# Patient Record
Sex: FEMALE | Race: WHITE | NOT HISPANIC OR LATINO | Employment: PART TIME | URBAN - METROPOLITAN AREA
[De-identification: names, ages, dates, MRNs, and addresses within clinical notes are randomized per-mention and may not be internally consistent; named-entity substitution may affect disease eponyms.]

---

## 2023-11-20 ENCOUNTER — HOSPITAL ENCOUNTER (INPATIENT)
Facility: MEDICAL CENTER | Age: 58
LOS: 2 days | DRG: 948 | End: 2023-11-22
Attending: STUDENT IN AN ORGANIZED HEALTH CARE EDUCATION/TRAINING PROGRAM | Admitting: INTERNAL MEDICINE
Payer: OTHER MISCELLANEOUS

## 2023-11-20 DIAGNOSIS — K59.00 CONSTIPATION, UNSPECIFIED CONSTIPATION TYPE: ICD-10-CM

## 2023-11-20 DIAGNOSIS — R10.9 ACUTE ABDOMINAL PAIN: ICD-10-CM

## 2023-11-20 PROBLEM — G89.29 CHRONIC LOWER BACK PAIN: Status: ACTIVE | Noted: 2023-11-20

## 2023-11-20 PROBLEM — M54.50 CHRONIC LOWER BACK PAIN: Status: ACTIVE | Noted: 2023-11-20

## 2023-11-20 PROCEDURE — 700102 HCHG RX REV CODE 250 W/ 637 OVERRIDE(OP): Performed by: INTERNAL MEDICINE

## 2023-11-20 PROCEDURE — 700111 HCHG RX REV CODE 636 W/ 250 OVERRIDE (IP): Mod: JZ | Performed by: INTERNAL MEDICINE

## 2023-11-20 PROCEDURE — 770006 HCHG ROOM/CARE - MED/SURG/GYN SEMI*

## 2023-11-20 PROCEDURE — A9270 NON-COVERED ITEM OR SERVICE: HCPCS | Performed by: INTERNAL MEDICINE

## 2023-11-20 PROCEDURE — 99223 1ST HOSP IP/OBS HIGH 75: CPT | Mod: AI | Performed by: INTERNAL MEDICINE

## 2023-11-20 RX ORDER — ACETAMINOPHEN 325 MG/1
650 TABLET ORAL EVERY 6 HOURS PRN
Status: DISCONTINUED | OUTPATIENT
Start: 2023-11-20 | End: 2023-11-22 | Stop reason: HOSPADM

## 2023-11-20 RX ORDER — BISACODYL 10 MG
10 SUPPOSITORY, RECTAL RECTAL
Status: DISCONTINUED | OUTPATIENT
Start: 2023-11-20 | End: 2023-11-22 | Stop reason: HOSPADM

## 2023-11-20 RX ORDER — ONDANSETRON 2 MG/ML
4 INJECTION INTRAMUSCULAR; INTRAVENOUS EVERY 4 HOURS PRN
Status: DISCONTINUED | OUTPATIENT
Start: 2023-11-20 | End: 2023-11-22 | Stop reason: HOSPADM

## 2023-11-20 RX ORDER — PROCHLORPERAZINE EDISYLATE 5 MG/ML
5-10 INJECTION INTRAMUSCULAR; INTRAVENOUS EVERY 4 HOURS PRN
Status: DISCONTINUED | OUTPATIENT
Start: 2023-11-20 | End: 2023-11-22 | Stop reason: HOSPADM

## 2023-11-20 RX ORDER — ENOXAPARIN SODIUM 100 MG/ML
40 INJECTION SUBCUTANEOUS DAILY
Status: DISCONTINUED | OUTPATIENT
Start: 2023-11-20 | End: 2023-11-22 | Stop reason: HOSPADM

## 2023-11-20 RX ORDER — OXYCODONE HYDROCHLORIDE 5 MG/1
5 TABLET ORAL
Status: DISCONTINUED | OUTPATIENT
Start: 2023-11-20 | End: 2023-11-22 | Stop reason: HOSPADM

## 2023-11-20 RX ORDER — POLYETHYLENE GLYCOL 3350 17 G/17G
1 POWDER, FOR SOLUTION ORAL
Status: DISCONTINUED | OUTPATIENT
Start: 2023-11-20 | End: 2023-11-22 | Stop reason: HOSPADM

## 2023-11-20 RX ORDER — PROMETHAZINE HYDROCHLORIDE 25 MG/1
12.5-25 TABLET ORAL EVERY 4 HOURS PRN
Status: DISCONTINUED | OUTPATIENT
Start: 2023-11-20 | End: 2023-11-22 | Stop reason: HOSPADM

## 2023-11-20 RX ORDER — LABETALOL HYDROCHLORIDE 5 MG/ML
10 INJECTION, SOLUTION INTRAVENOUS EVERY 4 HOURS PRN
Status: DISCONTINUED | OUTPATIENT
Start: 2023-11-20 | End: 2023-11-22 | Stop reason: HOSPADM

## 2023-11-20 RX ORDER — ONDANSETRON 4 MG/1
4 TABLET, ORALLY DISINTEGRATING ORAL EVERY 4 HOURS PRN
Status: DISCONTINUED | OUTPATIENT
Start: 2023-11-20 | End: 2023-11-22 | Stop reason: HOSPADM

## 2023-11-20 RX ORDER — TRAZODONE HYDROCHLORIDE 50 MG/1
25 TABLET ORAL
Status: DISCONTINUED | OUTPATIENT
Start: 2023-11-20 | End: 2023-11-22 | Stop reason: HOSPADM

## 2023-11-20 RX ORDER — MORPHINE SULFATE 4 MG/ML
4 INJECTION INTRAVENOUS
Status: DISCONTINUED | OUTPATIENT
Start: 2023-11-20 | End: 2023-11-22 | Stop reason: HOSPADM

## 2023-11-20 RX ORDER — OXYCODONE HYDROCHLORIDE 10 MG/1
10 TABLET ORAL
Status: DISCONTINUED | OUTPATIENT
Start: 2023-11-20 | End: 2023-11-22 | Stop reason: HOSPADM

## 2023-11-20 RX ORDER — ACETAMINOPHEN 325 MG/1
650 TABLET ORAL EVERY 6 HOURS PRN
Status: DISCONTINUED | OUTPATIENT
Start: 2023-11-20 | End: 2023-11-20

## 2023-11-20 RX ORDER — AMOXICILLIN 250 MG
2 CAPSULE ORAL 2 TIMES DAILY
Status: DISCONTINUED | OUTPATIENT
Start: 2023-11-20 | End: 2023-11-22 | Stop reason: HOSPADM

## 2023-11-20 RX ORDER — TRAMADOL HYDROCHLORIDE 50 MG/1
50 TABLET ORAL EVERY 6 HOURS PRN
Status: DISCONTINUED | OUTPATIENT
Start: 2023-11-20 | End: 2023-11-22 | Stop reason: HOSPADM

## 2023-11-20 RX ORDER — ONDANSETRON 2 MG/ML
4 INJECTION INTRAMUSCULAR; INTRAVENOUS EVERY 4 HOURS PRN
Status: DISCONTINUED | OUTPATIENT
Start: 2023-11-20 | End: 2023-11-20

## 2023-11-20 RX ORDER — PROMETHAZINE HYDROCHLORIDE 25 MG/1
12.5-25 SUPPOSITORY RECTAL EVERY 4 HOURS PRN
Status: DISCONTINUED | OUTPATIENT
Start: 2023-11-20 | End: 2023-11-22 | Stop reason: HOSPADM

## 2023-11-20 RX ADMIN — DOCUSATE SODIUM 50 MG AND SENNOSIDES 8.6 MG 2 TABLET: 8.6; 5 TABLET, FILM COATED ORAL at 20:04

## 2023-11-20 RX ADMIN — OXYCODONE 5 MG: 5 TABLET ORAL at 23:47

## 2023-11-20 RX ADMIN — TRAZODONE HYDROCHLORIDE 25 MG: 50 TABLET ORAL at 23:48

## 2023-11-20 RX ADMIN — ENOXAPARIN SODIUM 40 MG: 100 INJECTION SUBCUTANEOUS at 23:48

## 2023-11-20 ASSESSMENT — ENCOUNTER SYMPTOMS
POLYDIPSIA: 0
FEVER: 0
PHOTOPHOBIA: 0
COUGH: 0
ABDOMINAL PAIN: 1
FOCAL WEAKNESS: 0
PALPITATIONS: 0
NECK PAIN: 0
DOUBLE VISION: 0
SPEECH CHANGE: 0
NAUSEA: 0
ORTHOPNEA: 0
VOMITING: 0
SPUTUM PRODUCTION: 0
BLURRED VISION: 0
BRUISES/BLEEDS EASILY: 0
BACK PAIN: 0
FLANK PAIN: 0
HEMOPTYSIS: 0
WEIGHT LOSS: 0
HALLUCINATIONS: 0
CHILLS: 0
HEADACHES: 0
NERVOUS/ANXIOUS: 0
DIARRHEA: 0
CONSTIPATION: 1
HEARTBURN: 0
TREMORS: 0

## 2023-11-20 ASSESSMENT — COGNITIVE AND FUNCTIONAL STATUS - GENERAL
SUGGESTED CMS G CODE MODIFIER MOBILITY: CH
SUGGESTED CMS G CODE MODIFIER DAILY ACTIVITY: CH
MOBILITY SCORE: 24
DAILY ACTIVITIY SCORE: 24

## 2023-11-20 ASSESSMENT — LIFESTYLE VARIABLES
SUBSTANCE_ABUSE: 0
DOES PATIENT WANT TO STOP DRINKING: NO
EVER FELT BAD OR GUILTY ABOUT YOUR DRINKING: NO
CONSUMPTION TOTAL: NEGATIVE
HOW MANY TIMES IN THE PAST YEAR HAVE YOU HAD 5 OR MORE DRINKS IN A DAY: 0
TOTAL SCORE: 0
AVERAGE NUMBER OF DAYS PER WEEK YOU HAVE A DRINK CONTAINING ALCOHOL: 0
EVER HAD A DRINK FIRST THING IN THE MORNING TO STEADY YOUR NERVES TO GET RID OF A HANGOVER: NO
HAVE PEOPLE ANNOYED YOU BY CRITICIZING YOUR DRINKING: NO
TOTAL SCORE: 0
ALCOHOL_USE: NO
ON A TYPICAL DAY WHEN YOU DRINK ALCOHOL HOW MANY DRINKS DO YOU HAVE: 0
TOTAL SCORE: 0
HAVE YOU EVER FELT YOU SHOULD CUT DOWN ON YOUR DRINKING: NO

## 2023-11-20 ASSESSMENT — PATIENT HEALTH QUESTIONNAIRE - PHQ9
2. FEELING DOWN, DEPRESSED, IRRITABLE, OR HOPELESS: NOT AT ALL
SUM OF ALL RESPONSES TO PHQ9 QUESTIONS 1 AND 2: 0
1. LITTLE INTEREST OR PLEASURE IN DOING THINGS: NOT AT ALL

## 2023-11-20 ASSESSMENT — PAIN DESCRIPTION - PAIN TYPE: TYPE: ACUTE PAIN

## 2023-11-20 NOTE — PROGRESS NOTES
[facility] HOSPITALIST  DIRECT ADMISSION REPORT  Transferring facility:  [facility], [place], operated by [facility]  Transferring physician: Dr. Stevens  Code status: FULL    Chief complaint:   59 y/o F with hx of cholecystectomy (needed adhesion lysis and bile duct injury needed bypass) presented with acute on chronic abdominal pain for one month - fluid collection in abd (1.5 x 1.5x 1cm in GB area) and abd wall (6 x 2 x 1.5cm). No redness or induration     Vitals wnl  Labs - Normal WBC     Rx: morphine x1     Possible infection but without signs and symptoms of sepsis, Abx was on hold.    Case was discussed with Gen Surg at [facility] - recommended IR drainage.     The transfer request with a direct admit request was made as IR services is not available at this hospital    Further work up or recommendations per triage officer prior to transfer: Monitor    Consultants called prior to transfer and pertinent input from consultants: N/A     Consultants to be called upon arrival: IR and possible Gen Surg  Admission status: Inpatient  Floor requested: Med-Surg  Code Status: FULL    If ICU transfer, name of intensivist case discussed with and pertinent input from critical care: N/A    Please inform the RTOC triage coordinator upon arrival of the patient to [facility] for assignment of a hospitalist to perform admission.     For any question or concerns regarding the care of this patient, please reach out to the assigned hospitalist.

## 2023-11-21 ENCOUNTER — HOSPITAL ENCOUNTER (OUTPATIENT)
Dept: RADIOLOGY | Facility: MEDICAL CENTER | Age: 58
End: 2023-11-21
Payer: OTHER MISCELLANEOUS

## 2023-11-21 LAB
ALBUMIN SERPL BCP-MCNC: 3.9 G/DL (ref 3.2–4.9)
ALBUMIN/GLOB SERPL: 1.4 G/DL
ALP SERPL-CCNC: 91 U/L (ref 30–99)
ALT SERPL-CCNC: 7 U/L (ref 2–50)
ANION GAP SERPL CALC-SCNC: 11 MMOL/L (ref 7–16)
AST SERPL-CCNC: 14 U/L (ref 12–45)
BASOPHILS # BLD AUTO: 0.5 % (ref 0–1.8)
BASOPHILS # BLD: 0.04 K/UL (ref 0–0.12)
BILIRUB SERPL-MCNC: 0.6 MG/DL (ref 0.1–1.5)
BUN SERPL-MCNC: 5 MG/DL (ref 8–22)
CALCIUM ALBUM COR SERPL-MCNC: 8.9 MG/DL (ref 8.5–10.5)
CALCIUM SERPL-MCNC: 8.8 MG/DL (ref 8.5–10.5)
CHLORIDE SERPL-SCNC: 105 MMOL/L (ref 96–112)
CO2 SERPL-SCNC: 24 MMOL/L (ref 20–33)
CREAT SERPL-MCNC: 0.45 MG/DL (ref 0.5–1.4)
CRP SERPL HS-MCNC: 2.09 MG/DL (ref 0–0.75)
EOSINOPHIL # BLD AUTO: 0.02 K/UL (ref 0–0.51)
EOSINOPHIL NFR BLD: 0.2 % (ref 0–6.9)
ERYTHROCYTE [DISTWIDTH] IN BLOOD BY AUTOMATED COUNT: 36.7 FL (ref 35.9–50)
ERYTHROCYTE [SEDIMENTATION RATE] IN BLOOD BY WESTERGREN METHOD: 22 MM/HOUR (ref 0–25)
GFR SERPLBLD CREATININE-BSD FMLA CKD-EPI: 112 ML/MIN/1.73 M 2
GLOBULIN SER CALC-MCNC: 2.7 G/DL (ref 1.9–3.5)
GLUCOSE SERPL-MCNC: 107 MG/DL (ref 65–99)
HCT VFR BLD AUTO: 35 % (ref 37–47)
HGB BLD-MCNC: 11.7 G/DL (ref 12–16)
IMM GRANULOCYTES # BLD AUTO: 0.01 K/UL (ref 0–0.11)
IMM GRANULOCYTES NFR BLD AUTO: 0.1 % (ref 0–0.9)
INR PPP: 1.21 (ref 0.87–1.13)
LYMPHOCYTES # BLD AUTO: 1.63 K/UL (ref 1–4.8)
LYMPHOCYTES NFR BLD: 18.4 % (ref 22–41)
MCH RBC QN AUTO: 29.1 PG (ref 27–33)
MCHC RBC AUTO-ENTMCNC: 33.4 G/DL (ref 32.2–35.5)
MCV RBC AUTO: 87.1 FL (ref 81.4–97.8)
MONOCYTES # BLD AUTO: 0.67 K/UL (ref 0–0.85)
MONOCYTES NFR BLD AUTO: 7.6 % (ref 0–13.4)
NEUTROPHILS # BLD AUTO: 6.47 K/UL (ref 1.82–7.42)
NEUTROPHILS NFR BLD: 73.2 % (ref 44–72)
NRBC # BLD AUTO: 0 K/UL
NRBC BLD-RTO: 0 /100 WBC (ref 0–0.2)
PLATELET # BLD AUTO: 246 K/UL (ref 164–446)
PMV BLD AUTO: 10.9 FL (ref 9–12.9)
POTASSIUM SERPL-SCNC: 3.8 MMOL/L (ref 3.6–5.5)
PROT SERPL-MCNC: 6.6 G/DL (ref 6–8.2)
PROTHROMBIN TIME: 15.4 SEC (ref 12–14.6)
RBC # BLD AUTO: 4.02 M/UL (ref 4.2–5.4)
SODIUM SERPL-SCNC: 140 MMOL/L (ref 135–145)
WBC # BLD AUTO: 8.8 K/UL (ref 4.8–10.8)

## 2023-11-21 PROCEDURE — 99233 SBSQ HOSP IP/OBS HIGH 50: CPT | Performed by: INTERNAL MEDICINE

## 2023-11-21 PROCEDURE — 700111 HCHG RX REV CODE 636 W/ 250 OVERRIDE (IP): Mod: JZ | Performed by: INTERNAL MEDICINE

## 2023-11-21 PROCEDURE — 86140 C-REACTIVE PROTEIN: CPT

## 2023-11-21 PROCEDURE — 700102 HCHG RX REV CODE 250 W/ 637 OVERRIDE(OP): Performed by: INTERNAL MEDICINE

## 2023-11-21 PROCEDURE — 85025 COMPLETE CBC W/AUTO DIFF WBC: CPT

## 2023-11-21 PROCEDURE — A9270 NON-COVERED ITEM OR SERVICE: HCPCS | Performed by: INTERNAL MEDICINE

## 2023-11-21 PROCEDURE — 700105 HCHG RX REV CODE 258: Performed by: INTERNAL MEDICINE

## 2023-11-21 PROCEDURE — 85610 PROTHROMBIN TIME: CPT

## 2023-11-21 PROCEDURE — 85652 RBC SED RATE AUTOMATED: CPT

## 2023-11-21 PROCEDURE — 770006 HCHG ROOM/CARE - MED/SURG/GYN SEMI*

## 2023-11-21 PROCEDURE — 80053 COMPREHEN METABOLIC PANEL: CPT

## 2023-11-21 RX ORDER — GABAPENTIN 300 MG/1
300 CAPSULE ORAL
COMMUNITY

## 2023-11-21 RX ORDER — BACLOFEN 10 MG/1
5 TABLET ORAL 3 TIMES DAILY
Status: DISCONTINUED | OUTPATIENT
Start: 2023-11-21 | End: 2023-11-22 | Stop reason: HOSPADM

## 2023-11-21 RX ORDER — TRAZODONE HYDROCHLORIDE 50 MG/1
25 TABLET ORAL NIGHTLY
COMMUNITY

## 2023-11-21 RX ORDER — GABAPENTIN 100 MG/1
100 CAPSULE ORAL 3 TIMES DAILY
Status: DISCONTINUED | OUTPATIENT
Start: 2023-11-21 | End: 2023-11-22 | Stop reason: HOSPADM

## 2023-11-21 RX ORDER — TRAMADOL HYDROCHLORIDE 50 MG/1
50 TABLET ORAL
COMMUNITY

## 2023-11-21 RX ORDER — SODIUM CHLORIDE, SODIUM LACTATE, POTASSIUM CHLORIDE, CALCIUM CHLORIDE 600; 310; 30; 20 MG/100ML; MG/100ML; MG/100ML; MG/100ML
INJECTION, SOLUTION INTRAVENOUS CONTINUOUS
Status: DISCONTINUED | OUTPATIENT
Start: 2023-11-21 | End: 2023-11-21

## 2023-11-21 RX ADMIN — GABAPENTIN 100 MG: 100 CAPSULE ORAL at 17:36

## 2023-11-21 RX ADMIN — BACLOFEN 5 MG: 10 TABLET ORAL at 17:36

## 2023-11-21 RX ADMIN — DOCUSATE SODIUM 50 MG AND SENNOSIDES 8.6 MG 2 TABLET: 8.6; 5 TABLET, FILM COATED ORAL at 05:24

## 2023-11-21 RX ADMIN — TRAZODONE HYDROCHLORIDE 25 MG: 50 TABLET ORAL at 20:59

## 2023-11-21 RX ADMIN — ENOXAPARIN SODIUM 40 MG: 100 INJECTION SUBCUTANEOUS at 17:36

## 2023-11-21 RX ADMIN — SODIUM CHLORIDE, POTASSIUM CHLORIDE, SODIUM LACTATE AND CALCIUM CHLORIDE: 600; 310; 30; 20 INJECTION, SOLUTION INTRAVENOUS at 10:12

## 2023-11-21 ASSESSMENT — ENCOUNTER SYMPTOMS
PALPITATIONS: 0
MYALGIAS: 0
WEIGHT LOSS: 0
DIARRHEA: 0
PHOTOPHOBIA: 0
ABDOMINAL PAIN: 1
HEADACHES: 0
BACK PAIN: 0
CHILLS: 0
NAUSEA: 1
NECK PAIN: 0
SPUTUM PRODUCTION: 0
FEVER: 0
TINGLING: 0
ORTHOPNEA: 0
HALLUCINATIONS: 0
EYE PAIN: 0
DIZZINESS: 0
BLURRED VISION: 0
SHORTNESS OF BREATH: 0
VOMITING: 0
SPEECH CHANGE: 0
FOCAL WEAKNESS: 0
TREMORS: 0
DOUBLE VISION: 0
CONSTIPATION: 0
SENSORY CHANGE: 0
COUGH: 0

## 2023-11-21 ASSESSMENT — PAIN DESCRIPTION - PAIN TYPE
TYPE: ACUTE PAIN

## 2023-11-21 ASSESSMENT — PAIN SCALES - WONG BAKER
WONGBAKER_NUMERICALRESPONSE: HURTS JUST A LITTLE BIT
WONGBAKER_NUMERICALRESPONSE: HURTS JUST A LITTLE BIT

## 2023-11-21 ASSESSMENT — LIFESTYLE VARIABLES: SUBSTANCE_ABUSE: 0

## 2023-11-21 NOTE — ASSESSMENT & PLAN NOTE
58-year-old female with complicated cholecystectomy in 2019 with biliary tree  bypass, presented with gradually worsening abdominal pain to the right of umbilicus, that started 1 month ago and gradually progressing and moving towards the right upper quadrant  LFT and bilirubin are not elevated  CT of the abdomen revealed fluid in gallbladder fossa, fluid in the anterior abdominal wall corresponding to localized tenderness, to the right of umbilicus  I discussed with intervention radiologist and surgery.  Patient does not show signs of acute infection and holding administration of antibiotic.  Patient ESR is within normal limits.  The patient will show signs of infection she may need aspiration but at this time she does not show signs of infection.  I discussed plan of care with her and she is in agreement not to pursue further intervention at this time after discussion.  I started her on baclofen as she reported she lifted some heavy weight and it could be muscular pain.  I resume her outpatient gabapentin.   Declined

## 2023-11-21 NOTE — PROGRESS NOTES
Patient arrive via EMS by 1930 hr, she walked towards her room steady steps. She denied nausea,but some pain which she rated about  5/10 because the movement. Pain is in the RUQ and around the navel area. New iv of 22 G  inserted at the Right antecubital area. Last BM on the Nov 18. Bed in lowest and locked position. Call bell within reach.

## 2023-11-21 NOTE — PROGRESS NOTES
"Patient stated that she slept okay;\" pain still there, but it is no bad; unless I moved\"  Also, she feels anxious about wanting to get it over. She was requesting something to help to kill the anxiety.  "

## 2023-11-21 NOTE — PROGRESS NOTES
[facility] Medicine Daily Progress Note    Date of Service  11/21/2023    Chief Complaint  Precious Rosenberg is a 58 y.o. female admitted 11/20/2023 with diffuse abdominal pain    Hospital Course    58-year-old female with history of chronic lower back pain, complicated cholecystectomy in 2019 requiring a bypass of her bile duct,  who presented as a outside facility, complaining of diffuse abdominal pain for 1 months,  alternating constipation and diarrhea with constipation predominance, last BM 2 days ago, denies nausea vomiting chills or fever.  She denies weight loss as well.    CT of the abdomen and pelvis with contrast: Fluid collection in the abdominal wall to the right of umbilicus measuring 6 x 2 x 1.5 cm, may represent postop hematoma, abscess cannot be ruled out.  There is a small fluid collection adjacent to the abdominal wall which may be in the expected location of the gallbladder possibly representing postop fluid collection measuring about 1.5 x 1.5 x 1.0 cm.  There is some air in the biliary tree in the region of the michael hepatis and the left lobe of liver.  There appears to be some surrounding edematous tissue  possibly involving the antrum of the stomach, evidence of previous gastric surgery.  Pancreas and adrenal glands are unremarkable.  Small stone in the right kidney.  Kidneys are unremarkable.  No abnormality of the bowel is identified.  Appendix is not identified.      Lungs bases are clear.  Pelvis show fluid in the bladder.  Structure which may represent the uterus appears to be retroflexed at and appears to be small suggestions this may represent a cervical remnant     Patient was admitted to hospital and IR consult was requested.  I discussed plan of care with Dr. Ford interventional neurologist and patient has 2 small pockets of fluid.  I also requested consultation with Dr. Salvador who reviewed and recommended that patient has very small amount of fluid and he would not do strongly recommend  for drainage.    Interval Problem Update    11/21/23  I evaluated and examined her at the bedside.  She reported she is feeling better and she notices pain when she moves or increased pressure in the abdomen by coughing or vomiting.  I requested consultation with surgery Dr. Salvador who recommended that patient does not need surgical intervention and he would not strongly recommend for IR guided drainage.  I discussed with intervention radiologist Dr. Ford who expressed that patient is too small pockets and she will not require IR drain but he can aspirated but it is too small.  I reevaluated again at the bedside and explained her that chances of having this fluid collection being infected is less as patient white blood cell count is within normal limits and she remains afebrile.  Her ESR is normal limits.  She has elevated CRP.  After discussion plan is to hold for IR guided aspiration.  Patient reported she has been working and she left some weight that could also lead to muscle soreness.  I started her on muscle relaxant and resume her outpatient gabapentin.  I started her on diet.    I have discussed this patient's plan of care and discharge plan at IDT rounds today with Case Management, Nursing, Nursing leadership, and other members of the IDT team.    Consultants/Specialty  None    Code Status  Full Code    Disposition  The patient is not medically cleared for discharge to home or a post-acute facility.  Anticipate discharge to: home with close outpatient follow-up    I have placed the appropriate orders for post-discharge needs.    Review of Systems  Review of Systems   Constitutional:  Negative for chills, fever and weight loss.   HENT:  Negative for hearing loss and tinnitus.    Eyes:  Negative for blurred vision, double vision, photophobia and pain.   Respiratory:  Negative for cough, sputum production and shortness of breath.    Cardiovascular:  Negative for chest pain, palpitations, orthopnea and leg  swelling.   Gastrointestinal:  Positive for abdominal pain and nausea. Negative for constipation, diarrhea and vomiting.   Genitourinary:  Negative for dysuria, frequency and urgency.   Musculoskeletal:  Negative for back pain, joint pain, myalgias and neck pain.   Skin:  Negative for rash.   Neurological:  Negative for dizziness, tingling, tremors, sensory change, speech change, focal weakness and headaches.   Psychiatric/Behavioral:  Negative for hallucinations and substance abuse.    All other systems reviewed and are negative.       Physical Exam  Temp:  [35.9 °C (96.6 °F)-36.6 °C (97.8 °F)] 36.2 °C (97.1 °F)  Pulse:  [74-88] 83  Resp:  [16-17] 16  BP: (107-124)/(64-74) 116/72  SpO2:  [93 %-98 %] 94 %    Physical Exam  Vitals reviewed.   Constitutional:       General: She is not in acute distress.     Appearance: Normal appearance. She is not ill-appearing.   HENT:      Head: Normocephalic and atraumatic.      Nose: No congestion.   Eyes:      General:         Right eye: No discharge.         Left eye: No discharge.      Pupils: Pupils are equal, round, and reactive to light.   Cardiovascular:      Rate and Rhythm: Normal rate and regular rhythm.      Pulses: Normal pulses.      Heart sounds: Normal heart sounds. No murmur heard.  Pulmonary:      Effort: Pulmonary effort is normal. No respiratory distress.      Breath sounds: Normal breath sounds. No stridor.   Abdominal:      General: Bowel sounds are normal. There is no distension.      Palpations: Abdomen is soft.      Tenderness: There is abdominal tenderness (Mild in paramedical region).      Comments: No signs of acute abdomen on physical exam.   Musculoskeletal:         General: No swelling or tenderness. Normal range of motion.      Cervical back: Normal range of motion. No rigidity.   Skin:     General: Skin is warm.      Capillary Refill: Capillary refill takes less than 2 seconds.      Coloration: Skin is not jaundiced or pale.      Findings: No  bruising.   Neurological:      General: No focal deficit present.      Mental Status: She is alert and oriented to person, place, and time.      Cranial Nerves: No cranial nerve deficit.   Psychiatric:         Mood and Affect: Mood normal.         Behavior: Behavior normal.         Fluids  No intake or output data in the 24 hours ending 11/21/23 1445    Laboratory  Recent Labs     11/21/23  0032   WBC 8.8   RBC 4.02*   HEMOGLOBIN 11.7*   HEMATOCRIT 35.0*   MCV 87.1   MCH 29.1   MCHC 33.4   RDW 36.7   PLATELETCT 246   MPV 10.9     Recent Labs     11/21/23  0032   SODIUM 140   POTASSIUM 3.8   CHLORIDE 105   CO2 24   GLUCOSE 107*   BUN 5*   CREATININE 0.45*   CALCIUM 8.8     Recent Labs     11/21/23  0032   INR 1.21*               Imaging  OUTSIDE IMAGES-CT ABDOMEN /PELVIS   Final Result      CT-DRAIN-PERITONEAL    (Results Pending)        Assessment/Plan  * Abdominal pain and intra-abdominal fluid collection- (present on admission)  Assessment & Plan  58-year-old female with complicated cholecystectomy in 2019 with biliary tree  bypass, presented with gradually worsening abdominal pain to the right of umbilicus, that started 1 month ago and gradually progressing and moving towards the right upper quadrant  LFT and bilirubin are not elevated  CT of the abdomen revealed fluid in gallbladder fossa, fluid in the anterior abdominal wall corresponding to localized tenderness, to the right of umbilicus  I discussed with intervention radiologist and surgery.  Patient does not show signs of acute infection and holding administration of antibiotic.  Patient ESR is within normal limits.  The patient will show signs of infection she may need aspiration but at this time she does not show signs of infection.  I discussed plan of care with her and she is in agreement not to pursue further intervention at this time after discussion.  I started her on baclofen as she reported she lifted some heavy weight and it could be muscular pain.   I resume her outpatient gabapentin.    Constipation  Assessment & Plan  Bowel regimen    Chronic lower back pain  Assessment & Plan  Tylenol, tramadol as needed      I reviewed CT scans finding with interventional radiologist Dr. Ford.    I requested consultation and discussed with general surgery Dr. Salvador regarding patient current medical condition and plan of care.    >51 minutes total time spent in records review, lab/radiology assessment, patient history and assessment, and directing plan of care with high level medical decision making complexity. See orders.      VTE prophylaxis:    enoxaparin ppx      I have performed a physical exam and reviewed and updated ROS and Plan today (11/21/2023). In review of yesterday's note (11/20/2023), there are no changes except as documented above.

## 2023-11-21 NOTE — CARE PLAN
The patient is Stable - Low risk of patient condition declining or worsening    Shift Goals  Clinical Goals:  (pain management, IR procedure)  Patient Goals:  (comfort, sleep)  Family Goals: NA    Progress made toward(s) clinical / shift goals:    Problem: Knowledge Deficit - Standard  Goal: Patient and family/care givers will demonstrate understanding of plan of care, disease process/condition, diagnostic tests and medications  Outcome: Progressing  Note: Patient understands POC, patient kept notified regarding procedure     Problem: Pain - Standard  Goal: Alleviation of pain or a reduction in pain to the patient’s comfort goal  11/21/2023 1129 by Sharron Rocha R.N.  Outcome: Progressing  11/21/2023 1128 by Sharron Rocha R.N.  Flowsheets (Taken 11/21/2023 1128)  Non Verbal Scale: Sleeping  Pain Rating Scale (NPRS): 0  Note: Patient sleeping at this time       Patient is not progressing towards the following goals:

## 2023-11-21 NOTE — CARE PLAN
The patient is Stable - Low risk of patient condition declining or worsening    Shift Goals  Clinical Goals: pain control,  comfort  Patient Goals: comfort, sleep  Family Goals: NA    Progress made toward(s) clinical / shift goals: Accepted that she has pain because the movement, but if she stay put, the pain is tolerable. Ate dinner prior her NPO order. New 22 G iv placed in the RAC. Bed in lowest and locked position. Call bell within reach.      Problem: Knowledge Deficit - Standard  Goal: Patient and family/care givers will demonstrate understanding of plan of care, disease process/condition, diagnostic tests and medications  Description: Target End Date:  1-3 days or as soon as patient condition allows    Document in Patient Education    1.  Patient and family/caregiver oriented to unit, equipment, visitation policy and means for communicating concern  2.  Complete/review Learning Assessment  3.  Assess knowledge level of disease process/condition, treatment plan, diagnostic tests and medications  4.  Explain disease process/condition, treatment plan, diagnostic tests and medications  Outcome: Progressing

## 2023-11-21 NOTE — H&P
Hospital Medicine History & Physical Note    Date of Service  11/20/2023    Primary Care Physician  No primary care provider on file.        Code Status  Full Code    Chief Complaint  No chief complaint on file.      History of Presenting Illness  58-year-old female with history of chronic lower back pain, complicated cholecystectomy in 2019 requiring a bypass of her bile duct,  who presented as a outside facility, complaining of diffuse abdominal pain for 1 months,  alternating constipation and diarrhea with constipation predominance, last BM 2 days ago, denies nausea vomiting chills or fever.  She denies weight loss as well.    CT of the abdomen and pelvis with contrast: Fluid collection in the abdominal wall to the right of umbilicus measuring 6 x 2 x 1.5 cm, may represent postop hematoma, abscess cannot be ruled out.  There is a small fluid collection adjacent to the abdominal wall which may be in the expected location of the gallbladder possibly representing postop fluid collection measuring about 1.5 x 1.5 x 1.0 cm.  There is some air in the biliary tree in the region of the michael hepatis and the left lobe of liver.  There appears to be some surrounding edematous tissue  possibly involving the antrum of the stomach, evidence of previous gastric surgery.  Pancreas and adrenal glands are unremarkable.  Small stone in the right kidney.  Kidneys are unremarkable.  No abnormality of the bowel is identified.  Appendix is not identified.      Lungs bases are clear.  Pelvis show fluid in the bladder.  Structure which may represent the uterus appears to be retroflexed at and appears to be small suggestions this may represent a cervical remnant.  No specific abnormality.  CBC: WBC 9.0, hemoglobin 15.5, platelets 280.  Chemistry: Potassium 3.5, lipase 66, LFTs not elevated, otherwise no significant abnormalities.  Bilirubin is also normal.  Medications given: Morphine IV 4 mg, NS bolus 1 L, Zofran 4 mg.  Home  medications: Tramadol as needed, trazodone as needed, gabapentin         I discussed the plan of care with patient and bedside RN .    Review of Systems  Review of Systems   Constitutional:  Negative for chills, fever and weight loss.   HENT:  Negative for ear pain, hearing loss and tinnitus.    Eyes:  Negative for blurred vision, double vision and photophobia.   Respiratory:  Negative for cough, hemoptysis and sputum production.    Cardiovascular:  Negative for chest pain, palpitations and orthopnea.   Gastrointestinal:  Positive for abdominal pain and constipation. Negative for diarrhea, heartburn, nausea and vomiting.   Genitourinary:  Negative for dysuria, flank pain, frequency and hematuria.   Musculoskeletal:  Negative for back pain, joint pain and neck pain.   Skin:  Negative for itching and rash.   Neurological:  Negative for tremors, speech change, focal weakness and headaches.   Endo/Heme/Allergies:  Negative for environmental allergies and polydipsia. Does not bruise/bleed easily.   Psychiatric/Behavioral:  Negative for hallucinations and substance abuse. The patient is not nervous/anxious.        Past Medical History   has no past medical history on file.    Surgical History   has no past surgical history on file.     Family History  family history is not on file.   Family history reviewed with patient. There is no family history that is pertinent to the chief complaint.     Social History       Allergies  Not on File    Medications  None       Physical Exam                             Physical Exam  Vitals and nursing note reviewed.   Constitutional:       General: She is not in acute distress.     Appearance: Normal appearance.   HENT:      Head: Normocephalic and atraumatic.      Nose: Nose normal.      Mouth/Throat:      Mouth: Mucous membranes are moist.   Eyes:      Extraocular Movements: Extraocular movements intact.      Pupils: Pupils are equal, round, and reactive to light.   Cardiovascular:     "  Rate and Rhythm: Normal rate and regular rhythm.   Pulmonary:      Effort: Pulmonary effort is normal.      Breath sounds: Normal breath sounds.   Abdominal:      General: Abdomen is flat. There is no distension.      Tenderness: There is no abdominal tenderness.      Comments: Subtle localized swelling tender to palpation to the right of umbilicus  Midline scar  No erythema, no warmth   Musculoskeletal:         General: No swelling or deformity. Normal range of motion.      Cervical back: Normal range of motion and neck supple.   Skin:     General: Skin is warm and dry.   Neurological:      General: No focal deficit present.      Mental Status: She is alert and oriented to person, place, and time.   Psychiatric:         Mood and Affect: Mood normal.         Behavior: Behavior normal.         Laboratory:          No results for input(s): \"ALTSGPT\", \"ASTSGOT\", \"ALKPHOSPHAT\", \"TBILIRUBIN\", \"DBILIRUBIN\", \"GAMMAGT\", \"AMYLASE\", \"LIPASE\", \"ALB\", \"PREALBUMIN\", \"GLUCOSE\" in the last 72 hours.      No results for input(s): \"NTPROBNP\" in the last 72 hours.      No results for input(s): \"TROPONINT\" in the last 72 hours.    Imaging:  IR-CONSULT AND TREAT    (Results Pending)         Assessment/Plan:  Justification for Admission Status  I anticipate this patient will require at least two midnights for appropriate medical management, necessitating inpatient admission because intra-abdominal fluid collection    Patient will need a Med/Surg bed on MEDICAL service .  The need is secondary to intra-abdominal fluid collection.    * Abdominal pain and intra-abdominal fluid collection- (present on admission)  Assessment & Plan  58-year-old female with complicated cholecystectomy in 2019 with biliary tree  bypass, presented with gradually worsening abdominal pain to the right of umbilicus, that started 1 month ago and gradually progressing and moving towards the right upper quadrant  LFT and bilirubin are not elevated  CT of the " abdomen revealed fluid in gallbladder fossa, fluid in the anterior abdominal wall corresponding to localized tenderness, to the right of umbilicus  Plan: IR consult for drain, Gram and culture of the fluid  We will defer antibiotics as patient is afebrile, WBC is not elevated  Pain control  We will consider surgery consult    Constipation  Assessment & Plan  Bowel regimen    Chronic lower back pain  Assessment & Plan  Tylenol, tramadol as needed        VTE prophylaxis: enoxaparin ppx      Time: 127 minutes

## 2023-11-21 NOTE — PROGRESS NOTES
4 Eyes Skin Assessment Completed by HIEN Gordon and HIEN oTrres.    Head WDL  Ears WDL  Nose WDL  Mouth WDL  Neck WDL  Breast/Chest WDL  Shoulder Blades WDL  Spine WDL  (R) Arm/Elbow/Hand WDL  (L) Arm/Elbow/Hand WDL  Abdomen WDL  Groin WDL  Scrotum/Coccyx/Buttocks WDL  (R) Leg WDL  (L) Leg WDL  (R) Heel/Foot/Toe WDL  (L) Heel/Foot/Toe WDL          Devices In Places Blood Pressure Cuff      Interventions In Place Pillows    Possible Skin Injury No    Pictures Uploaded Into Epic N/A  Wound Consult Placed N/A  RN Wound Prevention Protocol Ordered No

## 2023-11-21 NOTE — DIETARY
"Nutrition services: Day 1 of admit.  Precious Rosenberg is a 58 y.o. female with admitting DX of Acute abdominal pain.  Consult received for BMI <19.    Pt states she lost weight from surgery and has been slowly regaining from a low of 83 lb. She states she eats well and chooses high calorie foods. Reviewed food preferences. She does not eat red meat and also avoids onions, though scallions are ok.    Assessment:  Height: 160 cm (5' 3\")  Weight: 44.5 kg (98 lb)  Body mass index is 17.36 kg/m²., BMI classification: Underweight  Diet/Intake: Regular, no beef or red meat    Evaluation:   Admit nutrition screen negative for unplanned weight loss or poor appetite.  Diet advanced to Regular today.  Labs and medications reviewed.    Malnutrition Risk: ASPEN criteria not met.    Recommendations/Plan:   Encourage intake of meals >50%.  Document intake of all meals as % taken in ADL's to provide interdisciplinary communication across all shifts.   Monitor weight.  Nutrition rep will continue to see patient for ongoing meal and snack preferences.   RD to monitor per department policy.          "

## 2023-11-22 ENCOUNTER — PHARMACY VISIT (OUTPATIENT)
Dept: PHARMACY | Facility: MEDICAL CENTER | Age: 58
End: 2023-11-22
Payer: COMMERCIAL

## 2023-11-22 VITALS
BODY MASS INDEX: 17.36 KG/M2 | DIASTOLIC BLOOD PRESSURE: 66 MMHG | WEIGHT: 98 LBS | RESPIRATION RATE: 16 BRPM | OXYGEN SATURATION: 96 % | TEMPERATURE: 97.3 F | HEART RATE: 72 BPM | HEIGHT: 63 IN | SYSTOLIC BLOOD PRESSURE: 110 MMHG

## 2023-11-22 PROBLEM — K59.00 CONSTIPATION: Status: RESOLVED | Noted: 2023-11-20 | Resolved: 2023-11-22

## 2023-11-22 PROBLEM — R10.9 ACUTE ABDOMINAL PAIN: Status: RESOLVED | Noted: 2023-11-20 | Resolved: 2023-11-22

## 2023-11-22 LAB
ALBUMIN SERPL BCP-MCNC: 3.6 G/DL (ref 3.2–4.9)
ALBUMIN/GLOB SERPL: 1.3 G/DL
ALP SERPL-CCNC: 82 U/L (ref 30–99)
ALT SERPL-CCNC: 7 U/L (ref 2–50)
ANION GAP SERPL CALC-SCNC: 12 MMOL/L (ref 7–16)
AST SERPL-CCNC: 15 U/L (ref 12–45)
BILIRUB SERPL-MCNC: 1 MG/DL (ref 0.1–1.5)
BUN SERPL-MCNC: 6 MG/DL (ref 8–22)
CALCIUM ALBUM COR SERPL-MCNC: 9.3 MG/DL (ref 8.5–10.5)
CALCIUM SERPL-MCNC: 9 MG/DL (ref 8.5–10.5)
CHLORIDE SERPL-SCNC: 104 MMOL/L (ref 96–112)
CO2 SERPL-SCNC: 24 MMOL/L (ref 20–33)
CREAT SERPL-MCNC: 0.48 MG/DL (ref 0.5–1.4)
ERYTHROCYTE [DISTWIDTH] IN BLOOD BY AUTOMATED COUNT: 36.1 FL (ref 35.9–50)
GFR SERPLBLD CREATININE-BSD FMLA CKD-EPI: 111 ML/MIN/1.73 M 2
GLOBULIN SER CALC-MCNC: 2.7 G/DL (ref 1.9–3.5)
GLUCOSE SERPL-MCNC: 72 MG/DL (ref 65–99)
HCT VFR BLD AUTO: 36 % (ref 37–47)
HGB BLD-MCNC: 11.8 G/DL (ref 12–16)
MAGNESIUM SERPL-MCNC: 1.6 MG/DL (ref 1.5–2.5)
MCH RBC QN AUTO: 28.6 PG (ref 27–33)
MCHC RBC AUTO-ENTMCNC: 32.8 G/DL (ref 32.2–35.5)
MCV RBC AUTO: 87.2 FL (ref 81.4–97.8)
PLATELET # BLD AUTO: 259 K/UL (ref 164–446)
PMV BLD AUTO: 10.4 FL (ref 9–12.9)
POTASSIUM SERPL-SCNC: 3.5 MMOL/L (ref 3.6–5.5)
PROT SERPL-MCNC: 6.3 G/DL (ref 6–8.2)
RBC # BLD AUTO: 4.13 M/UL (ref 4.2–5.4)
SODIUM SERPL-SCNC: 140 MMOL/L (ref 135–145)
WBC # BLD AUTO: 6.1 K/UL (ref 4.8–10.8)

## 2023-11-22 PROCEDURE — 700102 HCHG RX REV CODE 250 W/ 637 OVERRIDE(OP): Performed by: INTERNAL MEDICINE

## 2023-11-22 PROCEDURE — 85027 COMPLETE CBC AUTOMATED: CPT

## 2023-11-22 PROCEDURE — 83735 ASSAY OF MAGNESIUM: CPT

## 2023-11-22 PROCEDURE — 99239 HOSP IP/OBS DSCHRG MGMT >30: CPT | Performed by: INTERNAL MEDICINE

## 2023-11-22 PROCEDURE — 80053 COMPREHEN METABOLIC PANEL: CPT

## 2023-11-22 PROCEDURE — A9270 NON-COVERED ITEM OR SERVICE: HCPCS | Performed by: INTERNAL MEDICINE

## 2023-11-22 PROCEDURE — RXMED WILLOW AMBULATORY MEDICATION CHARGE: Performed by: INTERNAL MEDICINE

## 2023-11-22 PROCEDURE — 700102 HCHG RX REV CODE 250 W/ 637 OVERRIDE(OP): Mod: JZ | Performed by: INTERNAL MEDICINE

## 2023-11-22 PROCEDURE — A9270 NON-COVERED ITEM OR SERVICE: HCPCS | Mod: JZ | Performed by: INTERNAL MEDICINE

## 2023-11-22 RX ORDER — POTASSIUM CHLORIDE 20 MEQ/1
40 TABLET, EXTENDED RELEASE ORAL ONCE
Status: COMPLETED | OUTPATIENT
Start: 2023-11-22 | End: 2023-11-22

## 2023-11-22 RX ORDER — BACLOFEN 5 MG/1
5 TABLET ORAL 3 TIMES DAILY
Qty: 90 TABLET | Refills: 0 | Status: SHIPPED | OUTPATIENT
Start: 2023-11-22

## 2023-11-22 RX ORDER — BACLOFEN 5 MG/1
5 TABLET ORAL 3 TIMES DAILY
Qty: 90 TABLET | Refills: 0 | Status: SHIPPED | OUTPATIENT
Start: 2023-11-22 | End: 2023-11-22 | Stop reason: SDUPTHER

## 2023-11-22 RX ADMIN — GABAPENTIN 100 MG: 100 CAPSULE ORAL at 05:17

## 2023-11-22 RX ADMIN — POTASSIUM CHLORIDE 40 MEQ: 1500 TABLET, EXTENDED RELEASE ORAL at 08:30

## 2023-11-22 RX ADMIN — OXYCODONE 5 MG: 5 TABLET ORAL at 08:30

## 2023-11-22 RX ADMIN — BACLOFEN 5 MG: 10 TABLET ORAL at 05:18

## 2023-11-22 ASSESSMENT — PAIN DESCRIPTION - PAIN TYPE: TYPE: ACUTE PAIN

## 2023-11-22 NOTE — DISCHARGE PLANNING
1215  RN CM met with pt at the bedside.  She states she lives alone in [place].  She has a good support system.  Pt states family will pick her up and take her home.  No needs anticipated from case management.  
Negative

## 2023-11-22 NOTE — CARE PLAN
The patient is Stable - Low risk of patient condition declining or worsening    Shift Goals  Clinical Goals: Pain management  Patient Goals: Comfort  Family Goals: NA    Progress made toward(s) clinical / shift goals:    Problem: Knowledge Deficit - Standard  Goal: Patient and family/care givers will demonstrate understanding of plan of care, disease process/condition, diagnostic tests and medications  Outcome: Progressing     Problem: Pain - Standard  Goal: Alleviation of pain or a reduction in pain to the patient’s comfort goal 3  Patient will state pain 3 on a scale of 0-10 b the end of the shift.  Outcome: Progressing       Patient is not progressing towards the following goals:

## 2023-11-22 NOTE — DISCHARGE INSTRUCTIONS
DISCHARGE INSTRUCTIONS PER Amanda Calderon M.D.    Diagnosis: Abdominal pain    Please follow-up with surgery team in [place].  Please follow-up with primary care provider.  I prescribed you baclofen.  He already on gabapentin and tramadol.  While driving or using any heavy machinery please do not use these medications.  For any medical emergency please go to the nearest emergency room or call 911.            Abdominal Pain, Adult  Many things can cause belly (abdominal) pain. Most times, belly pain is not dangerous. Many cases of belly pain can be watched and treated at home. Sometimes, though, belly pain is serious. Your doctor will try to find the cause of your belly pain.  Follow these instructions at home:    Medicines  Take over-the-counter and prescription medicines only as told by your doctor.  Do not take medicines that help you poop (laxatives) unless told by your doctor.  General instructions  Watch your belly pain for any changes.  Drink enough fluid to keep your pee (urine) pale yellow.  Keep all follow-up visits as told by your doctor. This is important.  Contact a doctor if:  Your belly pain changes or gets worse.  You are not hungry, or you lose weight without trying.  You are having trouble pooping (constipated) or have watery poop (diarrhea) for more than 2-3 days.  You have pain when you pee or poop.  Your belly pain wakes you up at night.  Your pain gets worse with meals, after eating, or with certain foods.  You are vomiting and cannot keep anything down.  You have a fever.  You have blood in your pee.  Get help right away if:  Your pain does not go away as soon as your doctor says it should.  You cannot stop vomiting.  Your pain is only in areas of your belly, such as the right side or the left lower part of the belly.  You have bloody or black poop, or poop that looks like tar.  You have very bad pain, cramping, or bloating in your belly.  You have signs of not having enough fluid or  water in your body (dehydration), such as:  Dark pee, very little pee, or no pee.  Cracked lips.  Dry mouth.  Sunken eyes.  Sleepiness.  Weakness.  You have trouble breathing or chest pain.  Summary  Many cases of belly pain can be watched and treated at home.  Watch your belly pain for any changes.  Take over-the-counter and prescription medicines only as told by your doctor.  Contact a doctor if your belly pain changes or gets worse.  Get help right away if you have very bad pain, cramping, or bloating in your belly.  This information is not intended to replace advice given to you by your health care provider. Make sure you discuss any questions you have with your health care provider.  Document Revised: 04/27/2020 Document Reviewed: 04/27/2020  Elsevier Patient Education © 2023 [employer]

## 2023-11-22 NOTE — DISCHARGE SUMMARY
Discharge Summary    CHIEF COMPLAINT ON ADMISSION  No chief complaint on file.      Reason for Admission  Abdominal abscess     Admission Date  11/20/2023    CODE STATUS  Full Code    HPI & HOSPITAL COURSE    58-year-old female with history of chronic lower back pain, complicated cholecystectomy in 2019 requiring a bypass of her bile duct,  who presented as a outside facility, complaining of diffuse abdominal pain for 1 months,  alternating constipation and diarrhea with constipation predominance, last BM 2 days ago, denies nausea vomiting chills or fever.  She denies weight loss as well.    CT of the abdomen and pelvis with contrast: Fluid collection in the abdominal wall to the right of umbilicus measuring 6 x 2 x 1.5 cm, may represent postop hematoma, abscess cannot be ruled out.  There is a small fluid collection adjacent to the abdominal wall which may be in the expected location of the gallbladder possibly representing postop fluid collection measuring about 1.5 x 1.5 x 1.0 cm.  There is some air in the biliary tree in the region of the michael hepatis and the left lobe of liver.  There appears to be some surrounding edematous tissue  possibly involving the antrum of the stomach, evidence of previous gastric surgery.  Pancreas and adrenal glands are unremarkable.  Small stone in the right kidney.  Kidneys are unremarkable.  No abnormality of the bowel is identified.  Appendix is not identified.      Lungs bases are clear.  Pelvis show fluid in the bladder.  Structure which may represent the uterus appears to be retroflexed at and appears to be small suggestions this may represent a cervical remnant      Patient was admitted to hospital and IR consult was requested.  I discussed plan of care with Dr. Ford interventional radiologist and patient has 2 small pockets of fluid.  I also requested consultation with Dr. Salvador who reviewed and recommended that patient has very small amount of fluid and he would not do  strongly recommend for drainage.    Today I evaluated and examined her at the bedside.  She reported that she is feeling significantly better and she is able to tolerate her diet.  She had a bowel movement yesterday.  I discussed with patient that I checked her lab this morning and her white blood cell count within normal limits and she remains afebrile.  She found to have low potassium and I ordered oral potassium replacement for her.  Patient expressed that she is feeling significantly better and she feels ready to be discharged.  I discussed with her that she needs to follow-up with the surgery team who performed surgery back in 2019 and patient states that she will follow-up with them.    I discussed with patient that I prescribed baclofen and it did help her she expressed that baclofen helped in her symptoms.  I had a lengthy discussion with her that she should not take gabapentin, trazodone and baclofen while using any heavy machinery or driving.  She expressed that she does not take any of these medication prior to work.  She requested me to provide her letter that she can go back to work on December 2, 2023.    Today on the day of discharge patient denies any acute complaints and she feels ready to be discharged.  I discussed plan of care with her and answered all her questions.    I discussed with patient that if she noticed any acute abnormalities or any new symptoms of pain she should visit ER or call 911.      Therefore, she is discharged in fair and stable condition to home with close outpatient follow-up.    The patient met 2-midnight criteria for an inpatient stay at the time of discharge.    Discharge Date  11/22/23      FOLLOW UP ITEMS POST DISCHARGE  Primary care provider  Surgery team    DISCHARGE DIAGNOSES  Principal Problem (Resolved):    Abdominal pain and intra-abdominal fluid collection (POA: Yes)  Active Problems:    Chronic lower back pain (POA: Unknown)  Resolved Problems:    Constipation  (POA: Unknown)      FOLLOW UP  .  No follow-up provider specified.    MEDICATIONS ON DISCHARGE     Medication List        START taking these medications        Instructions   baclofen 5 MG Tabs  Commonly known as: Lioresal   Take 1 Tablet by mouth 3 times a day.  Dose: 5 mg            CONTINUE taking these medications        Instructions   gabapentin 300 MG Caps  Commonly known as: Neurontin   Take 300 mg by mouth 1 time a day as needed (not prn).  Dose: 300 mg     traMADol 50 MG Tabs  Commonly known as: Ultram   Take 50 mg by mouth 1 time a day as needed for Mild Pain.  Dose: 50 mg     traZODone 50 MG Tabs  Commonly known as: Desyrel   Take 25 mg by mouth every evening. Indications: Trouble Sleeping  Dose: 25 mg              Allergies  Allergies   Allergen Reactions    Imitrex [Sumatriptan]     Macrobid [Nitrofurantoin]        DIET  Orders Placed This Encounter   Procedures    Diet Order Diet: Regular (No beef or red meat)     Standing Status:   Standing     Number of Occurrences:   1     Order Specific Question:   Diet:     Answer:   Regular [1]     Comments:   No beef or red meat       ACTIVITY  As tolerated.  Weight bearing as tolerated    CONSULTATIONS  None    PROCEDURES  None    LABORATORY  Lab Results   Component Value Date    SODIUM 140 11/22/2023    POTASSIUM 3.5 (L) 11/22/2023    CHLORIDE 104 11/22/2023    CO2 24 11/22/2023    GLUCOSE 72 11/22/2023    BUN 6 (L) 11/22/2023    CREATININE 0.48 (L) 11/22/2023        Lab Results   Component Value Date    WBC 6.1 11/22/2023    HEMOGLOBIN 11.8 (L) 11/22/2023    HEMATOCRIT 36.0 (L) 11/22/2023    PLATELETCT 259 11/22/2023      OUTSIDE IMAGES-CT ABDOMEN /PELVIS   Final Result            Total time of the discharge process exceeds 36 minutes.

## 2023-11-22 NOTE — PROGRESS NOTES
Assumed care of patient. AO x4. With no complaints of pain. Routine assessment done. Vital signs within normal limits. Call light within reach and personal belongings within reach. Bed locked and in lowest position.Treaded socks in place. Care plan ongoing.